# Patient Record
Sex: FEMALE | Race: BLACK OR AFRICAN AMERICAN | Employment: FULL TIME | URBAN - METROPOLITAN AREA
[De-identification: names, ages, dates, MRNs, and addresses within clinical notes are randomized per-mention and may not be internally consistent; named-entity substitution may affect disease eponyms.]

---

## 2017-06-23 PROBLEM — I10 ESSENTIAL HYPERTENSION: Status: ACTIVE | Noted: 2017-06-23

## 2017-06-23 PROBLEM — S63.611A SPRAIN OF LEFT INDEX FINGER: Status: ACTIVE | Noted: 2017-06-23

## 2017-06-23 PROBLEM — G43.009 MIGRAINE WITHOUT AURA: Status: ACTIVE | Noted: 2017-06-23

## 2018-12-04 ENCOUNTER — HOSPITAL ENCOUNTER (EMERGENCY)
Age: 43
Discharge: HOME | End: 2018-12-05
Attending: EMERGENCY MEDICINE
Payer: COMMERCIAL

## 2018-12-04 DIAGNOSIS — R68.84 JAW PAIN: Primary | ICD-10-CM

## 2018-12-04 PROCEDURE — 99283 EMERGENCY DEPT VISIT LOW MDM: CPT | Performed by: EMERGENCY MEDICINE

## 2018-12-05 VITALS
BODY MASS INDEX: 30.91 KG/M2 | TEMPERATURE: 98.6 F | WEIGHT: 168 LBS | HEART RATE: 98 BPM | RESPIRATION RATE: 16 BRPM | DIASTOLIC BLOOD PRESSURE: 84 MMHG | HEIGHT: 62 IN | OXYGEN SATURATION: 99 % | SYSTOLIC BLOOD PRESSURE: 130 MMHG

## 2018-12-05 RX ORDER — IBUPROFEN 800 MG/1
800 TABLET ORAL
Qty: 20 TAB | Refills: 0 | Status: SHIPPED | OUTPATIENT
Start: 2018-12-05 | End: 2018-12-12

## 2018-12-05 RX ORDER — CLINDAMYCIN HYDROCHLORIDE 150 MG/1
300 CAPSULE ORAL 3 TIMES DAILY
Qty: 42 CAP | Refills: 0 | Status: SHIPPED | OUTPATIENT
Start: 2018-12-05 | End: 2022-02-08

## 2018-12-05 NOTE — ED PROVIDER NOTES
21-year-old female states she had a tooth pulled 3 weeks ago. This was a lower left premolar. She did have a filling put in a lower left molar  1 week ago. She's had some left jaw pain on and off for months as been attributed to infection. She seemed to have worse pain in the last 4 days and describes it as a stabbing sensation. No fever. She feels there is some redness in her mouth. She does have some pain with chewing. Some ear pain but no loss of hearing. Evidently was placed on some prednisone because a questionable left ear redness by urgent care recently. The history is provided by the patient. Jaw Pain The incident occurred more than 2 days ago. The quality of the pain is described as sharp. The pain is moderate. Pertinent negatives include no blurred vision. Past Medical History:  
Diagnosis Date  Hypertension Past Surgical History:  
Procedure Laterality Date  HX  SECTION    
 HX LITHOTRIPSY  HX TONSIL AND ADENOIDECTOMY  HX TONSILLECTOMY Family History:  
Problem Relation Age of Onset  Hypertension Father  Cancer Maternal Grandfather  Cancer Paternal Grandmother  Diabetes Paternal Grandfather  Coronary Artery Disease Neg Hx Social History Socioeconomic History  Marital status: SINGLE Spouse name: Not on file  Number of children: Not on file  Years of education: Not on file  Highest education level: Not on file Social Needs  Financial resource strain: Not on file  Food insecurity - worry: Not on file  Food insecurity - inability: Not on file  Transportation needs - medical: Not on file  Transportation needs - non-medical: Not on file Occupational History  Not on file Tobacco Use  Smoking status: Never Smoker  Smokeless tobacco: Never Used Substance and Sexual Activity  Alcohol use: Yes Comment: occ  Drug use: No  
 Sexual activity: Not on file Other Topics Concern  Not on file Social History Narrative  Not on file ALLERGIES: Codeine Review of Systems Constitutional: Negative for chills and fever. HENT: Positive for dental problem and ear pain. Negative for ear discharge and voice change. Eyes: Negative for blurred vision. Skin: Negative for color change and rash. Vitals:  
 12/04/18 2233 BP: (!) 182/103 Pulse: 98 Resp: 16 Temp: 98.6 °F (37 °C) SpO2: 99% Weight: 76.2 kg (168 lb) Height: 5' 2\" (1.575 m) Physical Exam  
Constitutional: She appears well-developed and well-nourished. No distress. HENT:  
Head: Normocephalic. Right Ear: External ear normal.  
Left Ear: External ear normal.  
Nose: Nose normal.  
Mouth/Throat:  
 
 
Some TMJ tenderness with opening and closing her mouth. Eyes: EOM are normal. Pupils are equal, round, and reactive to light. Neck: Normal range of motion. No muscular tenderness present. No edema and no erythema present. Nursing note and vitals reviewed. MDM Number of Diagnoses or Management Options Diagnosis management comments: No obvious dry socket. Patient's teeth do not seem to be particularly tender. We'll do have some concern about TMJ syndrome. There is erythema of the gum and will place patient on antibiotic. As patient to follow with her dentist.  Apoorva Gee give number for oral surgeon as a backup regarding potential TMJ symptoms. Risk of Complications, Morbidity, and/or Mortality Presenting problems: low Diagnostic procedures: minimal 
Management options: low Patient Progress Patient progress: stable Procedures

## 2018-12-05 NOTE — DISCHARGE INSTRUCTIONS
Avoid chewing ice or hard vegetables. They tried cool or warm compresses. Call your dentist or recheck on a recently repaired tooth. Consider calling oral surgeon regarding possible TMJ if dentist is not helpful in that respect. Recheck for high fever or swelling. Head or Face Pain: Care Instructions  Your Care Instructions    Common causes of head or face pain are allergies, stress, and injuries. Other causes include tooth problems and sinus infections. Eating certain foods, such as chocolate or cheese, or drinking certain liquids, such as coffee or cola, can cause head pain for some people. If you have mild head pain, you may not need treatment. It is important to watch your symptoms and talk to your doctor if your pain continues or gets worse. Follow-up care is a key part of your treatment and safety. Be sure to make and go to all appointments, and call your doctor if you are having problems. It's also a good idea to know your test results and keep a list of the medicines you take. How can you care for yourself at home? · Take pain medicines exactly as directed. ? If the doctor gave you a prescription medicine for pain, take it as prescribed. ? If you are not taking a prescription pain medicine, ask your doctor if you can take an over-the-counter pain medicine. · Take it easy for the next few days or longer if you are not feeling well. · Use a warm, moist towel or heating pad set on low to relax tight muscles in your shoulder and neck. Have someone gently massage your neck and shoulders. · Put ice or a cold pack on the area for 10 to 20 minutes at a time. Put a thin cloth between the ice and your skin. When should you call for help? Call 911 anytime you think you may need emergency care. For example, call if:    · You have twitching, jerking, or a seizure.     · You passed out (lost consciousness).     · You have symptoms of a stroke.  These may include:  ? Sudden numbness, tingling, weakness, or loss of movement in your face, arm, or leg, especially on only one side of your body. ? Sudden vision changes. ? Sudden trouble speaking. ? Sudden confusion or trouble understanding simple statements. ? Sudden problems with walking or balance. ? A sudden, severe headache that is different from past headaches.     · You have jaw pain and pain in your chest, shoulder, neck, or arm.    Call your doctor now or seek immediate medical care if:    · You have a fever with a stiff neck or a severe headache.     · You have nausea and vomiting, or you cannot keep food or liquids down.    Watch closely for changes in your health, and be sure to contact your doctor if:    · Your head or face pain does not get better as expected. Where can you learn more? Go to http://dione-talib.info/. Enter P568 in the search box to learn more about \"Head or Face Pain: Care Instructions. \"  Current as of: November 20, 2017  Content Version: 11.8  © 1714-1721 [employer]. Care instructions adapted under license by [employer] (which disclaims liability or warranty for this information). If you have questions about a medical condition or this instruction, always ask your healthcare professional. [place] [address] any warranty or liability for your use of this information. Temporomandibular Disorder: Care Instructions  Your Care Instructions    Temporomandibular (TM) disorders are a problem with the muscles and joints that connect your jaw to your skull. They cause pain when you open your mouth, chew, or yawn. You may feel this pain on one or both sides. TM disorders are often caused by tight jaw muscles. The tightness can be caused by clenching or grinding your teeth. This may happen when you have a lot of stress in your life. If you lower your stress, you may be able to stop clenching or grinding your teeth.  This will help relax your jaw and reduce your pain.  You may also be able to do some things at home to feel better. But if none of this works, your doctor may prescribe medicine to help relax your muscles and control the pain. Follow-up care is a key part of your treatment and safety. Be sure to make and go to all appointments, and call your doctor if you are having problems. It's also a good idea to know your test results and keep a list of the medicines you take. How can you care for yourself at home? · Put a warm, moist cloth or heating pad set on low on your jaw. Do this for 10 to 20 minutes at a time. Put a thin cloth between the heating pad and your skin. · Avoid hard or chewy foods that cause your jaws to work very hard. Examples include popcorn, jerky, tough meats, chewy breads, gum, and raw apples and carrots. · Choose softer foods that are easy to chew. These include eggs, yogurt, and soup. · Cut your food into small pieces. Chew slowly. · If your jaw gets too painful to chew, or if it locks, you may need to puree your food for a few days or weeks. · To relax your jaw, repeat this exercise for a few minutes every morning and evening. Watch yourself in a mirror. Gently open and close your mouth. Move your jaw straight up and down. But don't do this if it makes your pain worse. · Get at least 30 minutes of exercise on most days of the week to relieve stress. Walking is a good choice. You also may want to do other activities, such as running, swimming, cycling, or playing tennis or team sports. · Do not:  ? Hold a phone between your shoulder and your jaw. ? Open your mouth all the way, like when you sing loudly or yawn. ? Clench or grind your teeth, bite your lips, or chew your fingernails. ? Clench things such as pens, pipes, or cigars between your teeth. When should you call for help?   Call your doctor now or seek immediate medical care if:    · Your jaw is locked open or shut or it is hard to move your jaw.    Watch closely for changes in your health, and be sure to contact your doctor if:    · Your jaw pain gets worse.     · Your face is swollen.     · You do not get better as expected. Where can you learn more? Go to http://dione-talib.info/. Enter H748 in the search box to learn more about \"Temporomandibular Disorder: Care Instructions. \"  Current as of: March 28, 2018  Content Version: 11.8  © 1487-7249 [employer], [employer]. Care instructions adapted under license by [employer] (which disclaims liability or warranty for this information). If you have questions about a medical condition or this instruction, always ask your healthcare professional. [place] [address] any warranty or liability for your use of this information.

## 2018-12-05 NOTE — ED TRIAGE NOTES
Pt states that she had some dental work on the left lower gum area. C/o severe pain at the injection site and left ear pain

## 2018-12-05 NOTE — ED NOTES
I have reviewed discharge instructions with the patient. The patient verbalized understanding. Patient left ED via Discharge Method: ambulatory to Home with home. Opportunity for questions and clarification provided. Patient given 2 scripts. Follow up reviewed. To continue your aftercare when you leave the hospital, you may receive an automated call from our care team to check in on how you are doing. This is a free service and part of our promise to provide the best care and service to meet your aftercare needs.  If you have questions, or wish to unsubscribe from this service please call [phone]. Thank you for Choosing our [facility] Emergency Department.

## 2022-02-15 PROBLEM — G43.701 CHRONIC MIGRAINE WITHOUT AURA WITH STATUS MIGRAINOSUS, NOT INTRACTABLE: Status: ACTIVE | Noted: 2017-06-23

## 2022-03-18 PROBLEM — S63.611A SPRAIN OF LEFT INDEX FINGER: Status: ACTIVE | Noted: 2017-06-23

## 2022-03-19 PROBLEM — I10 ESSENTIAL HYPERTENSION: Status: ACTIVE | Noted: 2017-06-23

## 2022-03-20 PROBLEM — G43.701 CHRONIC MIGRAINE WITHOUT AURA WITH STATUS MIGRAINOSUS, NOT INTRACTABLE: Status: ACTIVE | Noted: 2017-06-23

## 2022-05-26 ENCOUNTER — TELEPHONE (OUTPATIENT)
Dept: NEUROLOGY | Age: 47
End: 2022-05-26

## 2022-05-26 DIAGNOSIS — G43.701 CHRONIC MIGRAINE WITHOUT AURA WITH STATUS MIGRAINOSUS, NOT INTRACTABLE: Primary | ICD-10-CM

## 2022-05-26 RX ORDER — GALCANEZUMAB 120 MG/ML
1 INJECTION, SOLUTION SUBCUTANEOUS
Qty: 1 ML | Refills: 11 | Status: SHIPPED | OUTPATIENT
Start: 2022-05-26 | End: 2022-07-31 | Stop reason: SDUPTHER

## 2022-05-26 NOTE — TELEPHONE ENCOUNTER
PA for Aimovig 70mg started. The request is currently under review. The plan will fax the determination, typically within 3 to 5 business days.

## 2022-05-26 NOTE — TELEPHONE ENCOUNTER
Patient called bc she still has not been able to get the [address] from the pharmacy. Dr Mitra Arnold had sent her a My Chart message asking what her symptoms were so we can appeal it with her insurance. Patient states that she gets a sharp pain in her head, nausea, vomiting and she cant get out of bed. This will last for days. She has Imitrex but it doesn't always work and she ends up using more a month then her insurance will allow. The only preventative she has tried in the past is Nebivolol. Can we try to get  [address] approved again? She will  samples of Aimovig and Ubrelvy in the meantime.

## 2022-07-29 ENCOUNTER — NURSE ONLY (OUTPATIENT)
Dept: NEUROLOGY | Age: 47
End: 2022-07-29

## 2022-07-29 DIAGNOSIS — G43.701 CHRONIC MIGRAINE WITHOUT AURA WITH STATUS MIGRAINOSUS, NOT INTRACTABLE: ICD-10-CM

## 2022-07-29 NOTE — PROGRESS NOTES
Patient presents for Emgality injection. VITAL SIGNS:  There were no vitals taken for this visit. Common adverse reactions including injection site reaction and constipation were reviewed with patient. Your aimovig dose is 240mg. This is given as a subcutaneous injection once a month. The syringe is very small, you will never see the syringe. Reviewed proper use of auto injector includin. Storage of medication in refrigerator until use and remove 30 min prior to use. Once removed from the refrigerator, the medication is good for 7 days. Do no place back in refrigerator. 2. Gather materials needed prior to injection  3. Review injection sites  4. Clean injection site  5. Remove cap from auto injector  6. Firmly press injector at 90 degree angle against site and press the purple start button. You will hear a loud click. Let go of the injector button. 7. Continue to press injector against you skin for about 15 seconds. 8. Dispose the auto injector in a sharps container. Patient demonstrated proper self injection technique with sample syringe and administered first self dose properly in office. Dose given:  Emgality 240mg subcutaneous injection  Site: bilateral thighs  Lot No : [id number]  Exp date: 2023  NDC: [id number]    Pt tolerated procedure without immediate adverse reaction.

## 2022-07-31 RX ORDER — UBROGEPANT 100 MG/1
100 TABLET ORAL PRN
Qty: 16 TABLET | Refills: 11 | Status: SHIPPED | OUTPATIENT
Start: 2022-07-31

## 2022-07-31 RX ORDER — GALCANEZUMAB 120 MG/ML
1 INJECTION, SOLUTION SUBCUTANEOUS
Qty: 1 ML | Refills: 11 | Status: SHIPPED | OUTPATIENT
Start: 2022-07-31 | End: 2022-08-11 | Stop reason: SDUPTHER

## 2022-08-03 ENCOUNTER — TELEPHONE (OUTPATIENT)
Dept: NEUROLOGY | Age: 47
End: 2022-08-03

## 2022-08-03 DIAGNOSIS — G43.701 CHRONIC MIGRAINE WITHOUT AURA WITH STATUS MIGRAINOSUS, NOT INTRACTABLE: ICD-10-CM

## 2022-08-04 ENCOUNTER — TELEPHONE (OUTPATIENT)
Dept: CARDIOLOGY CLINIC | Age: 47
End: 2022-08-04

## 2022-08-04 NOTE — TELEPHONE ENCOUNTER
Patient called stated that she is getting ready to start ADHD medication and wanted to know if it would interfere with her BP medicine that she is currently taking.  Please review and follow up, thanks

## 2022-08-04 NOTE — TELEPHONE ENCOUNTER
Patient needs a PA for Ubrelvy too. She states she went to [facility] and they tried to run both prescriptions yesterday and they are still being rejected by insurance.

## 2022-08-05 DIAGNOSIS — G43.701 CHRONIC MIGRAINE WITHOUT AURA WITH STATUS MIGRAINOSUS, NOT INTRACTABLE: ICD-10-CM

## 2022-08-05 RX ORDER — GALCANEZUMAB 120 MG/ML
1 INJECTION, SOLUTION SUBCUTANEOUS
Qty: 1 ML | Refills: 11 | Status: CANCELLED | OUTPATIENT
Start: 2022-08-05

## 2022-08-11 RX ORDER — GALCANEZUMAB 120 MG/ML
1 INJECTION, SOLUTION SUBCUTANEOUS
Qty: 3 ML | Refills: 3 | Status: SHIPPED | OUTPATIENT
Start: 2022-08-11

## 2022-11-22 DIAGNOSIS — G43.701 CHRONIC MIGRAINE WITHOUT AURA WITH STATUS MIGRAINOSUS, NOT INTRACTABLE: Primary | ICD-10-CM

## 2022-11-23 RX ORDER — SUMATRIPTAN 100 MG/1
TABLET, FILM COATED ORAL
Qty: 12 TABLET | Refills: 5 | Status: SHIPPED | OUTPATIENT
Start: 2022-11-23

## 2023-01-09 DIAGNOSIS — G43.701 CHRONIC MIGRAINE WITHOUT AURA WITH STATUS MIGRAINOSUS, NOT INTRACTABLE: ICD-10-CM

## 2023-01-09 RX ORDER — GALCANEZUMAB 120 MG/ML
1 INJECTION, SOLUTION SUBCUTANEOUS
Qty: 3 ML | Refills: 3 | Status: SHIPPED | OUTPATIENT
Start: 2023-01-09

## 2023-01-27 ENCOUNTER — TELEPHONE (OUTPATIENT)
Dept: NEUROLOGY | Age: 48
End: 2023-01-27

## 2023-02-23 NOTE — TELEPHONE ENCOUNTER
Pt was last seen 2/8/22. Mail box was full and was unable to leave message for pt to return call and schedule appt before medications can be sent to the pharmacy.

## 2023-02-24 RX ORDER — NEBIVOLOL 5 MG/1
TABLET ORAL
Qty: 30 TABLET | Refills: 11 | Status: SHIPPED | OUTPATIENT
Start: 2023-02-24

## 2023-02-24 NOTE — TELEPHONE ENCOUNTER
Pt is scheduled with Dr. Junior Piper on 4/10/23 @ 3:00 in the [facility] office. Pt informed of date, time, and location.